# Patient Record
Sex: MALE | Race: WHITE | HISPANIC OR LATINO | Employment: FULL TIME | URBAN - METROPOLITAN AREA
[De-identification: names, ages, dates, MRNs, and addresses within clinical notes are randomized per-mention and may not be internally consistent; named-entity substitution may affect disease eponyms.]

---

## 2023-12-07 ENCOUNTER — APPOINTMENT (OUTPATIENT)
Dept: CARDIOLOGY | Facility: HOSPITAL | Age: 53
End: 2023-12-07
Payer: COMMERCIAL

## 2023-12-20 ENCOUNTER — HOSPITAL ENCOUNTER (OUTPATIENT)
Dept: CARDIOLOGY | Facility: HOSPITAL | Age: 53
Discharge: HOME | End: 2023-12-20
Payer: COMMERCIAL

## 2023-12-20 DIAGNOSIS — Q21.12 PATENT FORAMEN OVALE (HHS-HCC): ICD-10-CM

## 2023-12-20 LAB
EJECTION FRACTION APICAL 4 CHAMBER: 58.8
EJECTION FRACTION: 60
LEFT ATRIUM VOLUME AREA LENGTH INDEX BSA: 32.1
LEFT VENTRICLE INTERNAL DIMENSION DIASTOLE: 4.6 (ref 3.5–6)
LEFT VENTRICULAR OUTFLOW TRACT DIAMETER: 2.3
TRICUSPID ANNULAR PLANE SYSTOLIC EXCURSION: 1.7

## 2023-12-20 PROCEDURE — 93308 TTE F-UP OR LMTD: CPT

## 2023-12-20 PROCEDURE — 93308 TTE F-UP OR LMTD: CPT | Performed by: INTERNAL MEDICINE

## 2024-01-10 ENCOUNTER — OFFICE VISIT (OUTPATIENT)
Dept: CARDIOLOGY | Facility: CLINIC | Age: 54
End: 2024-01-10
Payer: COMMERCIAL

## 2024-01-10 VITALS
HEIGHT: 69 IN | RESPIRATION RATE: 14 BRPM | HEART RATE: 81 BPM | OXYGEN SATURATION: 98 % | WEIGHT: 189 LBS | SYSTOLIC BLOOD PRESSURE: 150 MMHG | DIASTOLIC BLOOD PRESSURE: 85 MMHG | TEMPERATURE: 97.7 F | BODY MASS INDEX: 27.99 KG/M2

## 2024-01-10 DIAGNOSIS — Z87.74 S/P PATENT FORAMEN OVALE CLOSURE: Primary | ICD-10-CM

## 2024-01-10 PROCEDURE — 99214 OFFICE O/P EST MOD 30 MIN: CPT | Performed by: INTERNAL MEDICINE

## 2024-01-10 PROCEDURE — 1036F TOBACCO NON-USER: CPT | Performed by: INTERNAL MEDICINE

## 2024-01-10 RX ORDER — ATORVASTATIN CALCIUM 20 MG/1
1 TABLET, FILM COATED ORAL DAILY
COMMUNITY

## 2024-01-10 RX ORDER — ASPIRIN 81 MG/1
1 TABLET ORAL DAILY
COMMUNITY

## 2024-01-10 RX ORDER — TAMSULOSIN HYDROCHLORIDE 0.4 MG/1
0.4 CAPSULE ORAL DAILY
COMMUNITY
Start: 2023-10-27 | End: 2024-04-24

## 2024-01-10 RX ORDER — CLOPIDOGREL BISULFATE 75 MG/1
75 TABLET ORAL DAILY
COMMUNITY

## 2024-01-10 RX ORDER — AMLODIPINE BESYLATE 5 MG/1
5 TABLET ORAL
COMMUNITY
Start: 2023-01-03

## 2024-01-10 RX ORDER — PROPRANOLOL/HYDROCHLOROTHIAZID 40 MG-25MG
1 TABLET ORAL DAILY
COMMUNITY

## 2024-01-10 RX ORDER — ROSUVASTATIN CALCIUM 20 MG/1
20 TABLET, COATED ORAL NIGHTLY
COMMUNITY
Start: 2023-11-17 | End: 2024-02-15

## 2024-01-10 RX ORDER — CLOBETASOL PROPIONATE 0.5 MG/G
1 OINTMENT TOPICAL 2 TIMES DAILY
COMMUNITY
Start: 2023-07-19

## 2024-01-10 RX ORDER — CYCLOBENZAPRINE HCL 10 MG
10 TABLET ORAL DAILY PRN
COMMUNITY
Start: 2022-12-12

## 2024-01-10 RX ORDER — METOPROLOL SUCCINATE 25 MG/1
25 TABLET, EXTENDED RELEASE ORAL 2 TIMES DAILY
COMMUNITY

## 2024-01-10 RX ORDER — FINASTERIDE 5 MG/1
5 TABLET, FILM COATED ORAL
COMMUNITY
Start: 2023-01-30

## 2024-01-10 NOTE — PROGRESS NOTES
Patient is returning today for 12-month follow-up after PFO closure     For purpose of chart review, Harris Padron is an athletic 53-year-old male whoi n 2019 he had a suspected TIA.    The patient underwent successful PFO closure in December 8, 2022 with placement of a 25 mm Amplatzer PFO occluder device. Procedure was performed successfully and the patient discharged home same day on dual antiplatelet therapy without incident.      Patient continues to do well and denies any new neurologic events or symptoms. He has had a follow-up transthoracic echocardiogram performed last month that continues to demonstrate his occluder device to be in excellent position and there is no evidence of residual intracardiac shunting, i.e. negative bubble study.    Patient has no new cardiovascular complaints. At this point he is aspirin only.        ROS:     Constitutional: not feeling tired, not feeling poorly, no fever and no chills.   Eyes: no eyesight problems, no blurred vision, no diplopia, no eye pain, no purulent discharge from the eyes, eyes not red, no dryness of the eyes and no itching of the eyes.   ENT: no nosebleeds, no hearing loss, no tinnitus, no earache, no sore throat, no hoarseness, no swollen glands in the neck and no nasal discharge.   Cardiovascular: no intermittent leg claudication, no chest pain, no tightness or heavy pressure, no shortness of breath, no palpitations, no lower extremity edema, the heart rate was not slow, the heart rate was not fast and as noted in HPI.   Respiratory: no chronic cough, not coughing up sputum,  no wheezing that is consistent with asthma, no asthma, no orthopnea and no postural nocturnal dyspnea.   Gastrointestinal: no change in bowel habits, no blood in stools, no diarrhea, no constipation, no nausea, no vomiting, no abdominal pain, no signs and symptoms of ulcer disease, no onur colored stools and no intolerance to fatty foods.   Genitourinary: no hematuria,  no urinary  "frequency, no dysuria, no incontinence, no burning sensation during urination, no urinary hesitancy, no nocturia, no genital lesion, no testicular pain, urinary stream is not smaller and urinary stream does not start and stop.   Musculoskeletal: no arthralgias, no myalgias, no joint swelling, no joint stiffness, no muscle weakness, no back pain, no limb pain, no limb swelling and no difficulty walking.   Skin: no skin rashes, no change in skin color and pigmentation, no skin lesions and no skin lumps.   Neurological: no seizures, no frequent falls, no headaches, no dizziness, no tingling, no fainting and no limb weakness.   Psychiatric: no depression, not suicidal, no confusion, no memory lapses or loss, no anxiety, no personality change and no emotional problems.   Endocrine: no goiter, no thyroid disorder, no diabetes mellitus, no excessive thirst, no dry skin, no cold intolerance, no heat intolerance, no erectile dysfunction, no increased urinary frequency, no proptosis and no deepening of the voice.   Hematologic/Lymphatic: no bleeding issues.   All other systems have been reviewed and are negative for complaint.     Physical Exam:    Visit Vitals  /85 (BP Location: Right arm, Patient Position: Sitting)   Pulse 81   Temp 36.5 °C (97.7 °F) (Temporal)   Resp 14   Ht 1.753 m (5' 9\")   Wt 85.7 kg (189 lb)   SpO2 98%   BMI 27.91 kg/m²   Smoking Status Never   BSA 2.04 m²        Constitutional: alert and in no acute distress.   Eyes: no erythema, swelling or discharge from the eye .   Ears, Nose, Mouth, and Throat: external inspection of ears and nose is normal , lips, teeth, and gums are normal with good dentition  and oropharynx normal with no erythema, edema, exudate or lesions .   Neck: neck is supple, symmetric, trachea midline, no masses  and no thyromegaly .   Pulmonary: no increased work of breathing or signs of respiratory distress , lungs clear to auscultation. , normal percussion of chest  and chest " palpation normal .   Cardiovascular: RRR, no murmur,  no leg edema  Abdomen: abdomen non-tender, no masses  and no hepatomegaly .           Skin:  no skin lesions          Neurologic: non-focal neurologic examination.      Psychiatric judgment and insight is normal , oriented to person, place and time , normal mood and affect .       Labs:  Results for orders placed or performed during the hospital encounter of 12/20/23   Transthoracic Echo (TTE) Limited   Result Value Ref Range    LVOT diam 2.30     LV biplane EF 60     LA vol index A/L 32.1     Tricuspid annular plane systolic excursion 1.7     LVIDd 4.60     LV A4C EF 58.8           Meds:    Current Outpatient Medications   Medication Instructions    amLODIPine (NORVASC) 5 mg, oral, Daily RT    aspirin 81 mg EC tablet 1 tablet, oral, Daily    atorvastatin (Lipitor) 20 mg tablet 1 tablet, oral, Daily    clobetasol (Temovate) 0.05 % ointment 1 Application, Topical, 2 times daily    clopidogrel (PLAVIX) 75 mg, oral, Daily    coenzyme Q-10 300 mg capsule capsule 1 capsule, oral, Daily    cyclobenzaprine (FLEXERIL) 10 mg, oral, Daily PRN    finasteride (PROSCAR) 5 mg, oral, Daily RT    GARLIC OIL ORAL 1 tablet, oral, Daily    metoprolol succinate XL (TOPROL-XL) 25 mg, oral, 2 times daily    rosuvastatin (CRESTOR) 20 mg, oral, Nightly    tamsulosin (FLOMAX) 0.4 mg, oral, Daily    turmeric-turmeric root extract 450-50 mg capsule 1 capsule, oral, Daily      Reviewed transthoracic echocardiogram images done from December 2023 study demonstrating PFO occluder device to be in excellent position.  No evidence of intracardiac shunting.  No change from prior study.    Assessment:     This is a 53-year-old male who is status post PFO closure.  Patient is now 12 months following device closure and looks good.  There is no evidence of residual intracardiac shunting.      Patient is to continue aspirin for life.  He does not require continued surveillance of his device at this  point.  He does not require routine follow-up in this clinic but may be seen on an as-needed basis.

## 2024-02-08 ENCOUNTER — APPOINTMENT (OUTPATIENT)
Dept: CARDIOLOGY | Facility: HOSPITAL | Age: 54
End: 2024-02-08
Payer: COMMERCIAL

## 2024-10-14 PROCEDURE — 88305 TISSUE EXAM BY PATHOLOGIST: CPT | Performed by: DERMATOLOGY

## 2024-10-16 ENCOUNTER — LAB REQUISITION (OUTPATIENT)
Dept: DERMATOPATHOLOGY | Facility: CLINIC | Age: 54
End: 2024-10-16
Payer: COMMERCIAL

## 2024-10-16 DIAGNOSIS — L98.9 DISORDER OF THE SKIN AND SUBCUTANEOUS TISSUE, UNSPECIFIED: ICD-10-CM

## 2024-10-17 LAB
LABORATORY COMMENT REPORT: NORMAL
PATH REPORT.FINAL DX SPEC: NORMAL
PATH REPORT.GROSS SPEC: NORMAL
PATH REPORT.MICROSCOPIC SPEC OTHER STN: NORMAL
PATH REPORT.RELEVANT HX SPEC: NORMAL
PATH REPORT.TOTAL CANCER: NORMAL